# Patient Record
Sex: FEMALE | Race: BLACK OR AFRICAN AMERICAN | NOT HISPANIC OR LATINO | Employment: FULL TIME | URBAN - METROPOLITAN AREA
[De-identification: names, ages, dates, MRNs, and addresses within clinical notes are randomized per-mention and may not be internally consistent; named-entity substitution may affect disease eponyms.]

---

## 2018-05-04 ENCOUNTER — OFFICE VISIT (OUTPATIENT)
Dept: NEUROLOGY | Facility: CLINIC | Age: 33
End: 2018-05-04
Payer: OTHER GOVERNMENT

## 2018-05-04 VITALS
BODY MASS INDEX: 22.29 KG/M2 | HEART RATE: 82 BPM | SYSTOLIC BLOOD PRESSURE: 119 MMHG | WEIGHT: 142 LBS | HEIGHT: 67 IN | DIASTOLIC BLOOD PRESSURE: 84 MMHG

## 2018-05-04 DIAGNOSIS — G43.001 MIGRAINE WITHOUT AURA AND WITH STATUS MIGRAINOSUS, NOT INTRACTABLE: ICD-10-CM

## 2018-05-04 PROBLEM — G43.009 MIGRAINE WITHOUT AURA AND WITHOUT STATUS MIGRAINOSUS, NOT INTRACTABLE: Status: ACTIVE | Noted: 2018-05-04

## 2018-05-04 PROCEDURE — 99999 PR PBB SHADOW E&M-EST. PATIENT-LVL III: CPT | Mod: PBBFAC,,, | Performed by: PSYCHIATRY & NEUROLOGY

## 2018-05-04 PROCEDURE — 99204 OFFICE O/P NEW MOD 45 MIN: CPT | Mod: S$PBB,,, | Performed by: PSYCHIATRY & NEUROLOGY

## 2018-05-04 PROCEDURE — 99213 OFFICE O/P EST LOW 20 MIN: CPT | Mod: PBBFAC | Performed by: PSYCHIATRY & NEUROLOGY

## 2018-05-04 RX ORDER — METHYLPREDNISOLONE 4 MG/1
TABLET ORAL
Qty: 1 PACKAGE | Refills: 0 | Status: SHIPPED | OUTPATIENT
Start: 2018-05-04

## 2018-05-04 NOTE — PROGRESS NOTES
"Subjective:       Patient ID: Annamarie Ramos is a 33 y.o. female.    Chief Complaint:  Headache      Consultation Requested by:   Dandyrefbakari Self  No address on file    History of Present Illness  33-year-old right-handed female presents for evaluation of headaches that have been occurring for 8 days now.  She notes that she was initially started on oral contraceptive pills the highest she didn't content in July 2017 due to uterine fibroids.  She notes that she recently stopped taking her oral contraceptive pill on 4/17/18 and her headache started on 4/26/18.  Should initially attributed this to possible ALLERGIES.  She actually went to the emergency room at [facility] and was given Fioricet, Flonase, Afrin, Allegra.  She notes that none of these medications have helped her.  She was told by a friend of hers who is a nurse that she might need a CT scan of her head.  She denies any "red flag" symptoms such as focal motor weakness, sensory disturbance, aphasia, cranial nerve abnormality.  She notes that her headaches are bifrontal and can move bitemporal 30 to her occiput or to the apex of her head.  She notes that she currently has a headache that is 6 out of 10 on the pain scale.  She notes she is light sensitive with these headaches but not noise sensitive.  She notes no nausea or vomiting with these headaches.  History reviewed. No pertinent past medical history.    History reviewed. No pertinent surgical history.    History reviewed. No pertinent family history.    Social History     Social History    Marital status: Single     Spouse name: N/A    Number of children: N/A    Years of education: N/A     Social History Main Topics    Smoking status: Never Smoker    Smokeless tobacco: None    Alcohol use No    Drug use: No    Sexual activity: Not Asked     Other Topics Concern    None     Social History Narrative    None       Review of Systems  Review of Systems   Constitutional: Positive for fatigue. "   Musculoskeletal: Negative for neck stiffness.   Neurological: Positive for headaches. Negative for dizziness, facial asymmetry, weakness and numbness.   Psychiatric/Behavioral: Negative for sleep disturbance.   All other systems reviewed and are negative.      Objective:     Vitals:    05/04/18 0830   BP: 119/84   Pulse: 82      Physical Exam   Constitutional: She is oriented to person, place, and time. She appears well-developed and well-nourished. No distress.   HENT:   Head: Normocephalic and atraumatic.   Right Ear: Hearing normal.   Left Ear: Hearing normal.   Eyes: EOM are normal. Pupils are equal, round, and reactive to light. Right eye exhibits normal extraocular motion and no nystagmus. Left eye exhibits normal extraocular motion and no nystagmus.   Neck: Normal range of motion. Neck supple. No spinous process tenderness and no muscular tenderness present. Carotid bruit is not present. No neck rigidity.   Cardiovascular: Exam reveals no S4.    Musculoskeletal: Normal range of motion.   Neurological: She is alert and oriented to person, place, and time. She has normal strength and normal reflexes. She displays no atrophy and no tremor. No cranial nerve deficit or sensory deficit. She exhibits normal muscle tone. She displays a negative Romberg sign. Gait normal. Coordination and gait normal. GCS eye subscore is 4. GCS verbal subscore is 5. GCS motor subscore is 6.   Reflex Scores:       Tricep reflexes are 2+ on the right side and 2+ on the left side.       Bicep reflexes are 2+ on the right side and 2+ on the left side.       Brachioradialis reflexes are 2+ on the right side and 2+ on the left side.       Patellar reflexes are 2+ on the right side and 2+ on the left side.       Achilles reflexes are 2+ on the right side and 2+ on the left side.  Fundoscopic exam shows no papilledema, no hemorrhage, no exudates bilaterally.    Cranial nerves 2-12 are without deficit.   Psychiatric: She has a normal mood  and affect. Her speech is normal and behavior is normal. Thought content normal.   Vitals reviewed.      Neurologic Exam     Mental Status   Oriented to person, place, and time.   Attention: normal. Concentration: normal.   Speech: speech is normal   Level of consciousness: alert  Knowledge: good.   Normal comprehension.     Cranial Nerves   Cranial nerves II through XII intact.     CN II   Visual fields full to confrontation.     CN III, IV, VI   Pupils are equal, round, and reactive to light.  Extraocular motions are normal.     CN V   Facial sensation intact.     CN VII   Facial expression full, symmetric.     CN VIII   CN VIII normal.     CN IX, X   CN IX normal.   CN X normal.     CN XI   CN XI normal.     CN XII   CN XII normal.     Motor Exam   Muscle bulk: normal  Overall muscle tone: normal    Strength   Strength 5/5 throughout.     Sensory Exam   Light touch normal.   Pinprick normal.     Gait, Coordination, and Reflexes     Gait  Gait: normal    Reflexes   Right brachioradialis: 2+  Left brachioradialis: 2+  Right biceps: 2+  Left biceps: 2+  Right triceps: 2+  Left triceps: 2+  Right patellar: 2+  Left patellar: 2+  Right achilles: 2+  Left achilles: 2+    I spent over 50% of a 45 minute visit in guidance, counseling and discussion of treatment options.  Assessment/Plan:     Problem List Items Addressed This Visit        Neuro    Migraine without aura and with status migrainosus, not intractable    Overview     Since abruptly withdrawing OCPs on 4/26/18  Bifrontal, bitemporal, occipital and apex  6/10 headache currently  Light sensitive  Has been having headaches for 8 days         Relevant Medications    methylPREDNISolone (MEDROL DOSEPACK) 4 mg tablet          33-year-old right-handed female presents for evaluation of new onset headaches.  Her headaches appear to be migrainous.  At this point I hypothesize that her headaches are related to abrupt hormonal medication withdrawal area I will give her 6  day course of steroid to try to break her headache cycle.  I will see her back within 3 or 4 weeks to see if her headaches persist.  If they persist at that time I've explained to her that she is within the realm of migraines beginning.  I have spoken with her about red flags on neurologic examination.  If she has any of these symptoms exhibited than I would consider obtaining MRI imaging of her brain and/or starting her on a daily preventive prophylactic medication.  She is interested meditation and her supplements for migraines if it comes to that.  We have discussed the pathophysiology of migraines in the hormonal contributions that may occur with this at length today.    The patient verbalizes understanding and agreement with the treatment plan. Questions were sought and answered to her stated verbal satisfaction.        Delores Deshpande MD    This note is dictated on Dragon Natural Speaking word recognition program. There are word recognition mistakes that are occasionally missed on review.